# Patient Record
Sex: FEMALE | Race: WHITE
[De-identification: names, ages, dates, MRNs, and addresses within clinical notes are randomized per-mention and may not be internally consistent; named-entity substitution may affect disease eponyms.]

---

## 2018-05-04 ENCOUNTER — HOSPITAL ENCOUNTER (EMERGENCY)
Dept: HOSPITAL 92 - ERS | Age: 83
Discharge: HOME | End: 2018-05-04
Payer: MEDICARE

## 2018-05-04 DIAGNOSIS — M19.90: ICD-10-CM

## 2018-05-04 DIAGNOSIS — I10: ICD-10-CM

## 2018-05-04 DIAGNOSIS — L02.811: Primary | ICD-10-CM

## 2018-05-04 DIAGNOSIS — F03.90: ICD-10-CM

## 2018-05-04 DIAGNOSIS — E11.9: ICD-10-CM

## 2018-05-04 PROCEDURE — 10060 I&D ABSCESS SIMPLE/SINGLE: CPT

## 2018-05-04 PROCEDURE — 87070 CULTURE OTHR SPECIMN AEROBIC: CPT

## 2018-05-04 PROCEDURE — 87186 SC STD MICRODIL/AGAR DIL: CPT

## 2018-05-04 PROCEDURE — 87077 CULTURE AEROBIC IDENTIFY: CPT

## 2018-05-04 PROCEDURE — 87205 SMEAR GRAM STAIN: CPT

## 2018-06-16 ENCOUNTER — HOSPITAL ENCOUNTER (EMERGENCY)
Dept: HOSPITAL 92 - ERS | Age: 83
LOS: 1 days | Discharge: HOME | End: 2018-06-17
Payer: MEDICARE

## 2018-06-16 DIAGNOSIS — F03.90: Primary | ICD-10-CM

## 2018-06-16 DIAGNOSIS — Z79.899: ICD-10-CM

## 2018-06-16 DIAGNOSIS — I10: ICD-10-CM

## 2018-06-16 DIAGNOSIS — E11.9: ICD-10-CM
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
## 2018-06-16 DIAGNOSIS — Z79.4: ICD-10-CM

## 2018-06-16 DIAGNOSIS — M19.90: ICD-10-CM

## 2018-06-16 PROCEDURE — 80053 COMPREHEN METABOLIC PANEL: CPT

## 2018-06-16 PROCEDURE — 51701 INSERT BLADDER CATHETER: CPT

## 2018-06-16 PROCEDURE — A4353 INTERMITTENT URINARY CATH: HCPCS

## 2018-06-16 PROCEDURE — 81015 MICROSCOPIC EXAM OF URINE: CPT

## 2018-06-16 PROCEDURE — 70450 CT HEAD/BRAIN W/O DYE: CPT

## 2018-06-16 PROCEDURE — 84443 ASSAY THYROID STIM HORMONE: CPT

## 2018-06-16 PROCEDURE — 36415 COLL VENOUS BLD VENIPUNCTURE: CPT

## 2018-06-16 PROCEDURE — 82553 CREATINE MB FRACTION: CPT

## 2018-06-16 PROCEDURE — 71045 X-RAY EXAM CHEST 1 VIEW: CPT

## 2018-06-16 PROCEDURE — 72125 CT NECK SPINE W/O DYE: CPT

## 2018-06-16 PROCEDURE — 81003 URINALYSIS AUTO W/O SCOPE: CPT

## 2018-06-16 PROCEDURE — 82550 ASSAY OF CK (CPK): CPT

## 2018-06-16 PROCEDURE — 85025 COMPLETE CBC W/AUTO DIFF WBC: CPT

## 2018-06-16 PROCEDURE — 84484 ASSAY OF TROPONIN QUANT: CPT

## 2018-06-16 PROCEDURE — 93005 ELECTROCARDIOGRAM TRACING: CPT

## 2018-06-17 LAB
ALBUMIN SERPL BCG-MCNC: 4 G/DL (ref 3.4–4.8)
ALP SERPL-CCNC: 71 U/L (ref 40–150)
ALT SERPL W P-5'-P-CCNC: (no result) U/L (ref 8–55)
ANION GAP SERPL CALC-SCNC: 14 MMOL/L (ref 10–20)
AST SERPL-CCNC: 14 U/L (ref 5–34)
BASOPHILS # BLD AUTO: 0 THOU/UL (ref 0–0.2)
BASOPHILS NFR BLD AUTO: 0.5 % (ref 0–1)
BILIRUB SERPL-MCNC: 0.7 MG/DL (ref 0.2–1.2)
BUN SERPL-MCNC: 17 MG/DL (ref 9.8–20.1)
CALCIUM SERPL-MCNC: 9.4 MG/DL (ref 7.8–10.44)
CHLORIDE SERPL-SCNC: 99 MMOL/L (ref 98–107)
CK MB SERPL-MCNC: 0.5 NG/ML (ref 0–6.6)
CK SERPL-CCNC: 47 U/L (ref 29–168)
CO2 SERPL-SCNC: 26 MMOL/L (ref 23–31)
CREAT CL PREDICTED SERPL C-G-VRATE: 0 ML/MIN (ref 70–130)
CRYSTAL-AUWI FLAG: 0.1 (ref 0–15)
EOSINOPHIL # BLD AUTO: 0.1 THOU/UL (ref 0–0.7)
EOSINOPHIL NFR BLD AUTO: 0.6 % (ref 0–10)
GLOBULIN SER CALC-MCNC: 2.6 G/DL (ref 2.4–3.5)
GLUCOSE SERPL-MCNC: 132 MG/DL (ref 83–110)
HEV IGM SER QL: 31.1 (ref 0–7.99)
HGB BLD-MCNC: 11.6 G/DL (ref 12–16)
HYALINE CASTS #/AREA URNS LPF: (no result) LPF
LYMPHOCYTES # BLD: 1.8 THOU/UL (ref 1.2–3.4)
LYMPHOCYTES NFR BLD AUTO: 17.2 % (ref 21–51)
MCH RBC QN AUTO: 30.8 PG (ref 27–31)
MCV RBC AUTO: 88.7 FL (ref 81–99)
MONOCYTES # BLD AUTO: 0.9 THOU/UL (ref 0.11–0.59)
MONOCYTES NFR BLD AUTO: 8.3 % (ref 0–10)
NEUTROPHILS # BLD AUTO: 7.6 THOU/UL (ref 1.4–6.5)
NEUTROPHILS NFR BLD AUTO: 73.4 % (ref 42–75)
PATHC CAST-AUWI FLAG: 1.3 (ref 0–2.49)
PLATELET # BLD AUTO: 223 THOU/UL (ref 130–400)
POTASSIUM SERPL-SCNC: 3.8 MMOL/L (ref 3.5–5.1)
PROT UR STRIP.AUTO-MCNC: 100 MG/DL
RBC # BLD AUTO: 3.76 MILL/UL (ref 4.2–5.4)
RBC UR QL AUTO: (no result) HPF (ref 0–3)
SODIUM SERPL-SCNC: 135 MMOL/L (ref 136–145)
SP GR UR STRIP: 1.02 (ref 1–1.04)
SPERM-AUWI FLAG: 0 (ref 0–9.9)
TROPONIN I SERPL DL<=0.01 NG/ML-MCNC: (no result) NG/ML (ref ?–0.03)
WBC # BLD AUTO: 10.3 THOU/UL (ref 4.8–10.8)
YEAST-AUWI FLAG: 0 (ref 0–25)

## 2018-06-17 NOTE — CT
PRELIMINARY REPORT/[facility]/EMERGENCY AFTER

HOURS PROCEDURE: 

 

EXAM:

CT Head Without Intravenous Contrast

 

EXAM DATE/TIME:

6/17/2018 1:41 AM

 

CLINICAL HISTORY:

88 years old, female; Injury or trauma; Fall; Initial encounter; Abrasion; Not specified; Patient HX:
 Er 9; F88 presents to ed C/O fall. Pt's family reports nh called and said they found her on the floo
r this morning, speech slurred, BP high, and were worried of a stroke. Family reports dementia onset 
x5 years pta. Family reports HX dm.

 

TECHNIQUE:

Axial computed tomography images of the head/brain without intravenous contrast.

 

COMPARISON:

No relevant prior studies available.

 

FINDINGS:

Brain: Volume loss and chronic small vessel ischemic change. Right temporoparietal encephalomalacia/g
liosis. Old lacunar infarction(s).

Ventricles: Normal. No ventriculomegaly.

Bones/joints: Normal. No acute fracture.

Sinuses: Normal as visualized. No acute sinusitis.

Mastoid air cells: Normal as visualized. No mastoid effusion.

Soft tissues: Normal.

 

IMPRESSION:

No intracranial hemorrhage.

 

 

Thank you for allowing us to participate in the care of your patient.

Dictated and Authenticated by: Richy Peters MD

06/17/2018 2:18 AM Central Time ([place] & [place])

 

 

 

FINAL REPORT 

EMERGENCY AFTER HOURS CT BRAIN PERFORMED WITHOUT CONTRAST ENHANCEMENT:

 

Date:  06/17/18 

 

HISTORY:  

Fall with head injury. 

 

FINDINGS:

There is generalized ventricular and sulcal prominence. There is decreased attenuation to the periven
tricular white matter consistent with chronic white matter change. An old area of encephalomalacia ch
ayush in right posterior temporal parietooccipital region is noted. Lacunar infarcts are also seen. No
 hemorrhage or mass effect. Mastoid air cells and visualized sinuses are clear. 

 

IMPRESSION: 

No acute intracranial abnormalities. 

 

This report is in agreement with the preliminary report issued by [facility]. 

 

 

POS: [facility]

## 2018-06-17 NOTE — CT
PRELIMINARY REPORT/[facility]/EMERGENCY AFTER

HOURS PROCEDURE: 

 

EXAM:

CT Cervical Spine Without Intravenous Contrast

 

EXAM DATE/TIME:

6/17/2018 1:40 AM

 

CLINICAL HISTORY:

88 years old, female; Injury or trauma; Fall; Initial encounter; Abrasion; Patient HX: Er 9; F88 pres
ents to ed C/O fall. Pt's family reports nh called and said they found her on the floor this morning,
 speech slurred, BP high, and were worried of a stroke. Family reports dementia onset x5 years pta. F
amily reports HX dm.

 

TECHNIQUE:

Axial computed tomography images of the cervical spine without intravenous contrast. Coronal and sagi
ttal reformatted images were created and reviewed.

 

COMPARISON:

No relevant prior studies available.

 

FINDINGS:

Vertebrae: No fracture.

Discs/Spinal canal/Neural foramina: Degenerative change.

Soft tissues: Unremarkable.

Lung apices: Normal.

 

IMPRESSION:

No fracture.

 

Thank you for allowing us to participate in the care of your patient.

Dictated and Authenticated by: Richy Peters MD

06/17/2018 2:21 AM Central Time ([place] & [place])

 

FINAL REPORT 

EMERGENCY AFTER HOURS CT CERVICAL SPINE PERFORMED WITHOUT CONTRAST ENHANCEMENT:

 

Date:  06/17/18 

 

HISTORY:  

Fall with neck pain. 

 

FINDINGS:

Vertebral bodies are normal in height. Degenerative osteophytic changes are seen along the course of 
the lumbar spine with minimal disc narrowing at C5-6. There are marked degenerative facet changes pre
sent. There is some very mild bilateral foraminal narrowing at C3-4 and left-sided foraminal narrowin
g at C4-5. There is no CT evidence for fracture of the cervical spine. 

 

The lung apices are clear. Carotid bulb calcifications are seen. 

 

IMPRESSION: 

No CT evidence of fracture of the cervical spine. 

 

This report is in agreement with the preliminary report issued by [facility]. 

 

 

POS: [facility]

## 2018-06-17 NOTE — RAD
PORTABLE CHEST:

 

Date:  06/17/18 

 

HISTORY:  

Fall. 

 

FINDINGS:

Heart size within normal limits. There are atherosclerotic changes of aorta. Radiographic changes sug
gest an element of COPD. Bones appear demineralized. No rib fractures are identified. 

 

IMPRESSION: 

Chronic lung change. 

 

 

POS: [facility]

## 2019-01-03 ENCOUNTER — HOSPITAL ENCOUNTER (EMERGENCY)
Dept: HOSPITAL 92 - ERS | Age: 84
LOS: 1 days | Discharge: HOME | End: 2019-01-04
Payer: MEDICARE

## 2019-01-03 DIAGNOSIS — E78.00: ICD-10-CM

## 2019-01-03 DIAGNOSIS — I10: ICD-10-CM

## 2019-01-03 DIAGNOSIS — F03.90: ICD-10-CM

## 2019-01-03 DIAGNOSIS — Z79.84: ICD-10-CM

## 2019-01-03 DIAGNOSIS — N30.91: Primary | ICD-10-CM

## 2019-01-03 DIAGNOSIS — Z79.899: ICD-10-CM

## 2019-01-03 DIAGNOSIS — E11.9: ICD-10-CM

## 2019-01-03 LAB
BACTERIA UR QL AUTO: (no result) HPF
CRYSTAL-AUWI FLAG: 0 (ref 0–15)
GLUCOSE UR STRIP-MCNC: 500 MG/DL
HEV IGM SER QL: 0 (ref 0–7.99)
PATHC CAST-AUWI FLAG: 229.88 (ref 0–2.49)
PROT UR STRIP.AUTO-MCNC: 100 MG/DL
SP GR UR STRIP: 1.02 (ref 1–1.04)
SPERM-AUWI FLAG: 0 (ref 0–9.9)
YEAST-AUWI FLAG: 57.1 (ref 0–25)

## 2019-01-03 PROCEDURE — 87077 CULTURE AEROBIC IDENTIFY: CPT

## 2019-01-03 PROCEDURE — 87086 URINE CULTURE/COLONY COUNT: CPT

## 2019-01-03 PROCEDURE — A4353 INTERMITTENT URINARY CATH: HCPCS

## 2019-01-03 PROCEDURE — 81015 MICROSCOPIC EXAM OF URINE: CPT

## 2019-01-03 PROCEDURE — 51701 INSERT BLADDER CATHETER: CPT

## 2019-01-03 PROCEDURE — 81003 URINALYSIS AUTO W/O SCOPE: CPT

## 2019-01-04 LAB
CASTS #/AREA URNS LPF: (no result) LPF
HYALINE CASTS #/AREA URNS LPF: (no result) LPF
RBC UR QL AUTO: (no result) HPF (ref 0–3)

## 2019-12-26 ENCOUNTER — HOSPITAL ENCOUNTER (EMERGENCY)
Dept: HOSPITAL 92 - ERS | Age: 84
Discharge: HOME | End: 2019-12-26
Payer: MEDICARE
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
## 2019-12-26 DIAGNOSIS — I10: ICD-10-CM

## 2019-12-26 DIAGNOSIS — E11.9: ICD-10-CM

## 2019-12-26 DIAGNOSIS — S32.019A: Primary | ICD-10-CM
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
## 2019-12-26 DIAGNOSIS — E78.00: ICD-10-CM

## 2019-12-26 DIAGNOSIS — F03.90: ICD-10-CM

## 2019-12-26 PROCEDURE — 72100 X-RAY EXAM L-S SPINE 2/3 VWS: CPT

## 2019-12-26 NOTE — RAD
LUMBAR SPINE 3 VIEWS:

 

Date:  12/26/19 

 

HISTORY:  

Back pain. 

 

No comparison. 

 

FINDINGS:

Moderate degenerative changes of lumbar spine. Prominent osteophytes are seen anterolaterally. There 
is mild compression to L1 vertebra. Central and anterior height loss estimated at 20-25%. This is age
-indeterminate and could be subacute given history of recent back pain. Slight buckling of the poster
ior cortex. 

 

The other lumbar vertebra maintain height. Loss of disc space at L4-5 and L5-S1. Prominent facet hype
rtrophy. 

 

IMPRESSION: 

1.  Mild compression of L1 vertebra, age-indeterminate. 

2.  Moderate hypertrophic degenerative changes of the lumbar spine. 

 

 

POS: [facility]

## 2019-12-29 ENCOUNTER — HOSPITAL ENCOUNTER (EMERGENCY)
Dept: HOSPITAL 92 - ERS | Age: 84
Discharge: HOME | End: 2019-12-29
Payer: MEDICARE

## 2019-12-29 DIAGNOSIS — F03.90: ICD-10-CM

## 2019-12-29 DIAGNOSIS — I10: ICD-10-CM

## 2019-12-29 DIAGNOSIS — N39.0: Primary | ICD-10-CM

## 2019-12-29 DIAGNOSIS — Z79.84: ICD-10-CM

## 2019-12-29 DIAGNOSIS — E78.00: ICD-10-CM

## 2019-12-29 DIAGNOSIS — Z79.899: ICD-10-CM

## 2019-12-29 DIAGNOSIS — R53.1: ICD-10-CM

## 2019-12-29 DIAGNOSIS — M19.90: ICD-10-CM

## 2019-12-29 DIAGNOSIS — E11.9: ICD-10-CM

## 2019-12-29 LAB
ALBUMIN SERPL BCG-MCNC: 4.1 G/DL (ref 3.4–4.8)
ALP SERPL-CCNC: 125 U/L (ref 40–110)
ALT SERPL W P-5'-P-CCNC: 7 U/L (ref 8–55)
ANION GAP SERPL CALC-SCNC: 16 MMOL/L (ref 10–20)
AST SERPL-CCNC: 14 U/L (ref 5–34)
BACTERIA UR QL AUTO: (no result) HPF
BASOPHILS # BLD AUTO: 0.1 THOU/UL (ref 0–0.2)
BASOPHILS NFR BLD AUTO: 0.6 % (ref 0–1)
BILIRUB SERPL-MCNC: 0.6 MG/DL (ref 0.2–1.2)
BUN SERPL-MCNC: 34 MG/DL (ref 9.8–20.1)
CALCIUM SERPL-MCNC: 9.3 MG/DL (ref 7.8–10.44)
CHLORIDE SERPL-SCNC: 102 MMOL/L (ref 98–107)
CO2 SERPL-SCNC: 26 MMOL/L (ref 23–31)
CREAT CL PREDICTED SERPL C-G-VRATE: 0 ML/MIN (ref 70–130)
EOSINOPHIL # BLD AUTO: 0.1 THOU/UL (ref 0–0.7)
EOSINOPHIL NFR BLD AUTO: 1.4 % (ref 0–10)
GLOBULIN SER CALC-MCNC: 3.2 G/DL (ref 2.4–3.5)
GLUCOSE SERPL-MCNC: 257 MG/DL (ref 83–110)
GLUCOSE UR STRIP-MCNC: 70 MG/DL
HGB BLD-MCNC: 12.5 G/DL (ref 12–16)
LEUKOCYTE ESTERASE UR QL STRIP.AUTO: 250 LEU/UL
LYMPHOCYTES # BLD: 1.9 THOU/UL (ref 1.2–3.4)
LYMPHOCYTES NFR BLD AUTO: 19.5 % (ref 21–51)
MCH RBC QN AUTO: 30 PG (ref 27–31)
MCV RBC AUTO: 88 FL (ref 78–98)
MONOCYTES # BLD AUTO: 0.7 THOU/UL (ref 0.11–0.59)
MONOCYTES NFR BLD AUTO: 6.6 % (ref 0–10)
NEUTROPHILS # BLD AUTO: 7.2 THOU/UL (ref 1.4–6.5)
NEUTROPHILS NFR BLD AUTO: 71.8 % (ref 42–75)
PLATELET # BLD AUTO: 273 THOU/UL (ref 130–400)
POTASSIUM SERPL-SCNC: 4.6 MMOL/L (ref 3.5–5.1)
PROT UR STRIP.AUTO-MCNC: 50 MG/DL
RBC # BLD AUTO: 4.16 MILL/UL (ref 4.2–5.4)
RBC UR QL AUTO: (no result) HPF (ref 0–3)
SODIUM SERPL-SCNC: 139 MMOL/L (ref 136–145)
WBC # BLD AUTO: 10 THOU/UL (ref 4.8–10.8)
WBC UR QL AUTO: (no result) HPF (ref 0–3)

## 2019-12-29 PROCEDURE — 72125 CT NECK SPINE W/O DYE: CPT

## 2019-12-29 PROCEDURE — 93005 ELECTROCARDIOGRAM TRACING: CPT

## 2019-12-29 PROCEDURE — 80053 COMPREHEN METABOLIC PANEL: CPT

## 2019-12-29 PROCEDURE — 87086 URINE CULTURE/COLONY COUNT: CPT

## 2019-12-29 PROCEDURE — 85025 COMPLETE CBC W/AUTO DIFF WBC: CPT

## 2019-12-29 PROCEDURE — 96361 HYDRATE IV INFUSION ADD-ON: CPT

## 2019-12-29 PROCEDURE — 70450 CT HEAD/BRAIN W/O DYE: CPT

## 2019-12-29 PROCEDURE — A4353 INTERMITTENT URINARY CATH: HCPCS

## 2019-12-29 PROCEDURE — 81015 MICROSCOPIC EXAM OF URINE: CPT

## 2019-12-29 PROCEDURE — 96374 THER/PROPH/DIAG INJ IV PUSH: CPT

## 2019-12-29 PROCEDURE — 36415 COLL VENOUS BLD VENIPUNCTURE: CPT

## 2019-12-29 PROCEDURE — 81003 URINALYSIS AUTO W/O SCOPE: CPT

## 2019-12-29 PROCEDURE — 51701 INSERT BLADDER CATHETER: CPT

## 2019-12-29 NOTE — CT
EXAM: CT of the cervical spine without contrast



HISTORY: Right arm and hand weakness for 2 days. Likely fall



COMPARISON: 6/17/2018



TECHNIQUE: Multiple contiguous axial images were obtained in a CT of the cervical spine without contr
ast. Sagittal and coronal reformats were performed.



FINDINGS: The vertebral bodies and intervertebral discs demonstrate normal height and alignment witho
ut fracture or subluxation. Mild to moderate diffuse degenerative changes are present.  No

prevertebral soft tissue swelling is seen. 



The posterior facets are well aligned. Normal alignment of the skull base with the cervical spine is 
seen.



The lung apices are unremarkable. Calcifications are seen in the carotid arteries.



IMPRESSION: No evidence of acute osseous abnormality of the cervical spine.



Reported By: José Miguel Beltran 

Electronically Signed:  12/29/2019 2:13 PM

## 2020-02-11 ENCOUNTER — HOSPITAL ENCOUNTER (INPATIENT)
Dept: HOSPITAL 92 - ERS | Age: 85
LOS: 6 days | Discharge: NURSING, REHAB OR LONG-TERM CARE | DRG: 871 | End: 2020-02-17
Attending: INTERNAL MEDICINE | Admitting: INTERNAL MEDICINE
Payer: MEDICARE

## 2020-02-11 VITALS — BODY MASS INDEX: 19.3 KG/M2

## 2020-02-11 DIAGNOSIS — E11.65: ICD-10-CM

## 2020-02-11 DIAGNOSIS — Z90.49: ICD-10-CM

## 2020-02-11 DIAGNOSIS — N17.9: ICD-10-CM

## 2020-02-11 DIAGNOSIS — F02.80: ICD-10-CM

## 2020-02-11 DIAGNOSIS — A41.9: Primary | ICD-10-CM

## 2020-02-11 DIAGNOSIS — J18.9: ICD-10-CM

## 2020-02-11 DIAGNOSIS — E44.0: ICD-10-CM

## 2020-02-11 DIAGNOSIS — R65.20: ICD-10-CM

## 2020-02-11 DIAGNOSIS — E87.2: ICD-10-CM

## 2020-02-11 DIAGNOSIS — I10: ICD-10-CM

## 2020-02-11 DIAGNOSIS — G30.9: ICD-10-CM

## 2020-02-11 LAB
ALBUMIN SERPL BCG-MCNC: 3.6 G/DL (ref 3.4–4.8)
ALP SERPL-CCNC: 138 U/L (ref 40–110)
ALT SERPL W P-5'-P-CCNC: (no result) U/L (ref 8–55)
ANION GAP SERPL CALC-SCNC: 17 MMOL/L (ref 10–20)
AST SERPL-CCNC: 10 U/L (ref 5–34)
BILIRUB SERPL-MCNC: 0.9 MG/DL (ref 0.2–1.2)
BUN SERPL-MCNC: 31 MG/DL (ref 9.8–20.1)
CALCIUM SERPL-MCNC: 9.4 MG/DL (ref 7.8–10.44)
CHLORIDE SERPL-SCNC: 94 MMOL/L (ref 98–107)
CK SERPL-CCNC: 27 U/L (ref 29–168)
CO2 SERPL-SCNC: 23 MMOL/L (ref 23–31)
CREAT CL PREDICTED SERPL C-G-VRATE: 0 ML/MIN (ref 70–130)
GLOBULIN SER CALC-MCNC: 4.1 G/DL (ref 2.4–3.5)
GLUCOSE SERPL-MCNC: 380 MG/DL (ref 83–110)
GLUCOSE UR STRIP-MCNC: >=1000 MG/DL
HGB BLD-MCNC: 11.7 G/DL (ref 12–16)
LIPASE SERPL-CCNC: 13 U/L (ref 8–78)
MAGNESIUM SERPL-MCNC: 2 MG/DL (ref 1.6–2.6)
MCH RBC QN AUTO: 29.3 PG (ref 27–31)
MCV RBC AUTO: 88.9 FL (ref 78–98)
MDIFF COMPLETE?: YES
PLATELET # BLD AUTO: 374 THOU/UL (ref 130–400)
POTASSIUM SERPL-SCNC: 3.6 MMOL/L (ref 3.5–5.1)
PROT UR STRIP.AUTO-MCNC: 200 MG/DL
RBC # BLD AUTO: 4.01 MILL/UL (ref 4.2–5.4)
RBC UR QL AUTO: (no result) HPF (ref 0–3)
SODIUM SERPL-SCNC: 130 MMOL/L (ref 136–145)
WBC # BLD AUTO: 22.5 THOU/UL (ref 4.8–10.8)
WBC UR QL AUTO: (no result) HPF (ref 0–3)

## 2020-02-11 PROCEDURE — 71046 X-RAY EXAM CHEST 2 VIEWS: CPT

## 2020-02-11 PROCEDURE — 96365 THER/PROPH/DIAG IV INF INIT: CPT

## 2020-02-11 PROCEDURE — 84484 ASSAY OF TROPONIN QUANT: CPT

## 2020-02-11 PROCEDURE — 36415 COLL VENOUS BLD VENIPUNCTURE: CPT

## 2020-02-11 PROCEDURE — 81015 MICROSCOPIC EXAM OF URINE: CPT

## 2020-02-11 PROCEDURE — 81003 URINALYSIS AUTO W/O SCOPE: CPT

## 2020-02-11 PROCEDURE — 87804 INFLUENZA ASSAY W/OPTIC: CPT

## 2020-02-11 PROCEDURE — 82550 ASSAY OF CK (CPK): CPT

## 2020-02-11 PROCEDURE — 51701 INSERT BLADDER CATHETER: CPT

## 2020-02-11 PROCEDURE — 36416 COLLJ CAPILLARY BLOOD SPEC: CPT

## 2020-02-11 PROCEDURE — 85025 COMPLETE CBC W/AUTO DIFF WBC: CPT

## 2020-02-11 PROCEDURE — 83880 ASSAY OF NATRIURETIC PEPTIDE: CPT

## 2020-02-11 PROCEDURE — 71045 X-RAY EXAM CHEST 1 VIEW: CPT

## 2020-02-11 PROCEDURE — 83735 ASSAY OF MAGNESIUM: CPT

## 2020-02-11 PROCEDURE — 93005 ELECTROCARDIOGRAM TRACING: CPT

## 2020-02-11 PROCEDURE — 96368 THER/DIAG CONCURRENT INF: CPT

## 2020-02-11 PROCEDURE — 87086 URINE CULTURE/COLONY COUNT: CPT

## 2020-02-11 PROCEDURE — 80048 BASIC METABOLIC PNL TOTAL CA: CPT

## 2020-02-11 PROCEDURE — A4353 INTERMITTENT URINARY CATH: HCPCS

## 2020-02-11 PROCEDURE — 96361 HYDRATE IV INFUSION ADD-ON: CPT

## 2020-02-11 PROCEDURE — 87040 BLOOD CULTURE FOR BACTERIA: CPT

## 2020-02-11 PROCEDURE — 83690 ASSAY OF LIPASE: CPT

## 2020-02-11 PROCEDURE — 83605 ASSAY OF LACTIC ACID: CPT

## 2020-02-11 PROCEDURE — 80053 COMPREHEN METABOLIC PANEL: CPT

## 2020-02-11 RX ADMIN — CEFTRIAXONE SCH MLS: 1 INJECTION, POWDER, FOR SOLUTION INTRAMUSCULAR; INTRAVENOUS at 20:55

## 2020-02-11 RX ADMIN — AZITHROMYCIN SCH MLS: 500 INJECTION, POWDER, LYOPHILIZED, FOR SOLUTION INTRAVENOUS at 21:11

## 2020-02-11 NOTE — PDOC.HHP
Hospitalist HPI





- History of Present Illness


Cough


History of Present Illness: 


Ms. Walls is a an 89-year-old lady with past medical history of dementia, type 

2 diabetes, hypertension, who presents from the nursing home with cough.  

Reportedly she has had decreased intake and has been coughing extensively in 

the nursing home.  Majority of the history was obtained from the daughter.  She 

has been living in the nursing home for the past several years.  Her dementia 

has progressed to the point that she is unable to recognize her surroundings.  

She does still carry on conversations with the daughter and remember some long-

term aspects of her life.  Reportedly in the ED, her WBC count was 22,000, 

there were checks x-ray which demonstrated a right basilar pneumonia, she had 

increased her from her creatinine to 1.3-1.9.  Daughter states that she does 

spend majority of her time in bed.  States that she was admitted for pneumonia 

many years ago but has not been hospitalized for some time now.





Hospitalist ROS





- Review of Systems


ROS unobtainable: due to mental status





- Medication


Medications: 


Active Medications











Generic Name Dose Route Start Last Admin





  Trade Name Freq  PRN Reason Stop Dose Admin


 


Sodium Chloride  1,000 mls @ 100 mls/hr  02/11/20 17:29  02/11/20 17:44





  Normal Saline 0.9%  IV  02/12/20 03:25  1,000 mls





  .Q10H RITA   Administration





     





     





     





     








 











 Medication  Instructions  Recorded  Confirmed  Type


 


Acetaminophen With Codeine 1 tablet PO Q6HR PRN 02/11/20 02/11/20 History





[Tylenol with Codeine #3]    


 


Acetaminophen [Tylenol] 650 mg PO Q4HR PRN 02/11/20 02/11/20 History


 


Cyanocobalamin (Vitamin B-12) 1,000 mcg PO DAILY 02/11/20 02/11/20 History





[B-12]    


 


Cyclobenzaprine [Flexeril] 10 mg PO TID PRN 02/11/20 02/11/20 History


 


Dextromethorphan Polistirex 5 ml PO Q6HR PRN 02/11/20 02/11/20 History





[12-Hour Cough Relief]    


 


Dextromethorphan Polistirex [Cough 5 ml PO Q12HR PRN 02/11/20 02/11/20 History





DM ER]    


 


Losartan/Hydrochlorothiazide 50 mg PO DAILY 02/11/20 02/11/20 History





[Losartan-Hctz 50-12.5 mg Tab]    


 


Rosuvastatin Calcium 10 mg PO DAILY 02/11/20 02/11/20 History


 


metFORMIN HCl [Metformin HCl] 500 mg PO DAILY 02/11/20 02/11/20 History














Hospitalist History





- Past Medical History


Cardiac: reports: HTN


Pulmonary: reports: no pertinent history


CNS: reports: Dementia


Gastrointestinal: reports: no pertinent history


Heme/Onc: reports: no pertinent history


Hepatobiliary: reports: no pertinent history


Psych: reports: no pertinent history


Musculoskeletal: reports: no pertinent history


Rheumatologic: reports: no pertinent history


Infectious Disease: reports: no pertinent history


ENT: reports: no pertinent history


Renal/: reports: no pertinent history


Endocrine: reports: Diabetes





- Past Surgical History


Past Surgical History: reports: Appendectomy





- Family History


Family History: reports: cardiac disorder





- Social History


Alcohol: reports: None


Drugs: reports: none


Living Situation: Nursing Home





- Exam


General Appearance: NAD, ill appearing


General - other findings: temporal wasting


Eye: PERRL, anicteric sclera


ENT: normocephalic atraumatic


Neck: supple, no JVD


Heart: RRR, no murmur, no gallops


Respiratory: CTAB, no wheezes, normal chest expansion, rales


Gastrointestinal: soft, non-tender, non-distended, normal bowel sounds


Extremities: no cyanosis, no clubbing, no edema


Skin: normal turgor, no lesions, no rashes


Neurological: cranial nerve grossly intact, normal sensation to touch, no focal 

deficits


Musculoskeletal: normal strength, no muscle wasting, generalized weakness, 

diffuse muscle atrophy


Psychiatric: normal affect, normal behavior





Hospitalist Results





- Labs


Result Diagrams: 


 02/11/20 14:07





 02/11/20 14:07


Lab results: 


 











WBC  22.5 thou/uL (4.8-10.8)  H  02/11/20  14:07    


 


Hgb  11.7 g/dL (12.0-16.0)  L  02/11/20  14:07    


 


Hct  35.6 % (36.0-47.0)  L  02/11/20  14:07    


 


MCV  88.9 fL (78.0-98.0)   02/11/20  14:07    


 


Plt Count  374 thou/uL (130-400)   02/11/20  14:07    


 


Band Neuts % (Manual)  4 % (5-11)  L  02/11/20  14:07    


 


Sodium  130 mmol/L (136-145)  L  02/11/20  14:07    


 


Potassium  3.6 mmol/L (3.5-5.1)   02/11/20  14:07    


 


Chloride  94 mmol/L ()  L  02/11/20  14:07    


 


Carbon Dioxide  23 mmol/L (23-31)   02/11/20  14:07    


 


BUN  31 mg/dL (9.8-20.1)  H  02/11/20  14:07    


 


Creatinine  1.97 mg/dL (0.6-1.1)  H  02/11/20  14:07    


 


Glucose  380 mg/dL ()  H  02/11/20  14:07    


 


Lactic Acid  3.6 mmol/L (0.5-2.2)  H  02/11/20  17:59    


 


Calcium  9.4 mg/dL (7.8-10.44)   02/11/20  14:07    


 


Total Bilirubin  0.9 mg/dL (0.2-1.2)   02/11/20  14:07    


 


AST  10 U/L (5-34)   02/11/20  14:07    


 


ALT  Less than  7 U/L (8-55)  L  02/11/20  14:07    


 


Alkaline Phosphatase  138 U/L ()  H  02/11/20  14:07    


 


Creatine Kinase  27 U/L ()  L  02/11/20  14:07    


 


Troponin I  0.016 ng/mL (< 0.028)   02/11/20  14:07    


 


B-Natriuretic Peptide  135.4 pg/mL (0-100)  H  02/11/20  14:07    


 


Serum Total Protein  7.7 g/dL (6.0-8.3)   02/11/20  14:07    


 


Albumin  3.6 g/dL (3.4-4.8)   02/11/20  14:07    


 


Lipase  13 U/L (8-78)   02/11/20  14:07    


 


Urine Ketones  Negative mg/dL (Negative)   02/11/20  14:37    


 


Urine Blood  1+  (Negative)  A  02/11/20  14:37    


 


Urine Nitrite  Negative  (Negative)   02/11/20  14:37    


 


Ur Leukocyte Esterase  Negative Elva/uL (Negative)   02/11/20  14:37    


 


Urine RBC  0-3 HPF (0-3)   02/11/20  14:37    


 


Urine WBC  0-3 HPF (0-3)   02/11/20  14:37    


 


Ur Squamous Epith Cells  0-3 HPF (0-3)   02/11/20  14:37    


 


Urine Bacteria  None Seen HPF (None Seen)   02/11/20  14:37    











Additional comment: 





 Vital Signs - 24 hr











  02/11/20 02/11/20





  17:26 18:00


 


Temperature 98.4 F 


 


Pulse Rate 89 


 


Respiratory 18 





Rate  


 


Blood Pressure 163/72 H 





[Supine]  


 


O2 Sat by Pulse 96 96





Oximetry  














- EKG Interpretation


EKG: 





reviewed by me in the chart





- Radiology Interpretation


  ** Chest x-ray


Status: report reviewed by me





Hospitalist H&P A/P





- Problem


(1) Community acquired pneumonia


Code(s): J18.9 - PNEUMONIA, UNSPECIFIED ORGANISM   Status: Acute   





(2) Severe sepsis


Code(s): A41.9 - SEPSIS, UNSPECIFIED ORGANISM; R65.20 - SEVERE SEPSIS WITHOUT 

SEPTIC SHOCK   Status: Acute   





(3) Acute kidney injury


Code(s): N17.9 - ACUTE KIDNEY FAILURE, UNSPECIFIED   Status: Acute   





(4) Dementia


Code(s): F03.90 - UNSPECIFIED DEMENTIA WITHOUT BEHAVIORAL DISTURBANCE   Status: 

Acute   





(5) HTN (hypertension)


Code(s): I10 - ESSENTIAL (PRIMARY) HYPERTENSION   Status: Acute   





(6) T2DM (type 2 diabetes mellitus)


Status: Acute   





(7) Debility


Code(s): R53.81 - OTHER MALAISE   Status: Acute   





- Plan


Plan: 


Treatment for pneumonia/severe sepsis.  Likely greater than 2 midnights 

required in the evaluation and treatment of this illness


Start ceftriaxone and azithromycin, follow sputum and blood cultures


Consult for PT/OT/speech evaluation


She received early goal-directed therapy of 2 L in the ED, continue with NS at 

100, continue to monitor lactate


We will resume home medications for chronic conditions once they have been 

reconciled


MARIUM related to the sepsis, will fluid challenge the kidney





DVT prophylaxis: Lovenox, renally dosed


CODE STATUS: Full code


ACP: Daughter is the surrogate decision maker


Disposition: Continue to treat for pneumonia.  Evaluation by speech.

## 2020-02-11 NOTE — RAD
XR Chest 1 View Portable



HISTORY: Cough



COMPARISON: 6/20/2018



FINDINGS: The heart size is normal. The lungs are well expanded without pneumothorax or pleural effus
ions. There is patchy infiltrate in the right medial lung base



IMPRESSION: Findings are suspicious for right basilar pneumonia.



Reported By: Eduin Yates 

Electronically Signed:  2/11/2020 2:36 PM

## 2020-02-12 LAB
ALBUMIN SERPL BCG-MCNC: 3 G/DL (ref 3.4–4.8)
ALP SERPL-CCNC: 113 U/L (ref 40–110)
ALT SERPL W P-5'-P-CCNC: (no result) U/L (ref 8–55)
ANION GAP SERPL CALC-SCNC: 15 MMOL/L (ref 10–20)
AST SERPL-CCNC: 9 U/L (ref 5–34)
BASOPHILS # BLD AUTO: 0 THOU/UL (ref 0–0.2)
BASOPHILS NFR BLD AUTO: 0.1 % (ref 0–1)
BILIRUB SERPL-MCNC: 0.6 MG/DL (ref 0.2–1.2)
BUN SERPL-MCNC: 25 MG/DL (ref 9.8–20.1)
CALCIUM SERPL-MCNC: 8.6 MG/DL (ref 7.8–10.44)
CHLORIDE SERPL-SCNC: 103 MMOL/L (ref 98–107)
CO2 SERPL-SCNC: 21 MMOL/L (ref 23–31)
CREAT CL PREDICTED SERPL C-G-VRATE: 22 ML/MIN (ref 70–130)
EOSINOPHIL # BLD AUTO: 0.1 THOU/UL (ref 0–0.7)
EOSINOPHIL NFR BLD AUTO: 0.3 % (ref 0–10)
GLOBULIN SER CALC-MCNC: 3.3 G/DL (ref 2.4–3.5)
GLUCOSE SERPL-MCNC: 137 MG/DL (ref 83–110)
HGB BLD-MCNC: 10.4 G/DL (ref 12–16)
LYMPHOCYTES # BLD: 1.7 THOU/UL (ref 1.2–3.4)
LYMPHOCYTES NFR BLD AUTO: 7.4 % (ref 21–51)
MCH RBC QN AUTO: 30.4 PG (ref 27–31)
MCV RBC AUTO: 88.3 FL (ref 78–98)
MONOCYTES # BLD AUTO: 1.8 THOU/UL (ref 0.11–0.59)
MONOCYTES NFR BLD AUTO: 8.1 % (ref 0–10)
NEUTROPHILS # BLD AUTO: 19 THOU/UL (ref 1.4–6.5)
NEUTROPHILS NFR BLD AUTO: 84.2 % (ref 42–75)
PLATELET # BLD AUTO: 327 THOU/UL (ref 130–400)
POTASSIUM SERPL-SCNC: 3 MMOL/L (ref 3.5–5.1)
RBC # BLD AUTO: 3.41 MILL/UL (ref 4.2–5.4)
SODIUM SERPL-SCNC: 136 MMOL/L (ref 136–145)
WBC # BLD AUTO: 22.6 THOU/UL (ref 4.8–10.8)

## 2020-02-12 RX ADMIN — INSULIN LISPRO PRN UNIT: 100 INJECTION, SOLUTION INTRAVENOUS; SUBCUTANEOUS at 00:22

## 2020-02-12 RX ADMIN — AZITHROMYCIN SCH MLS: 500 INJECTION, POWDER, LYOPHILIZED, FOR SOLUTION INTRAVENOUS at 21:07

## 2020-02-12 RX ADMIN — CEFTRIAXONE SCH MLS: 1 INJECTION, POWDER, FOR SOLUTION INTRAMUSCULAR; INTRAVENOUS at 21:06

## 2020-02-12 RX ADMIN — INSULIN LISPRO PRN UNIT: 100 INJECTION, SOLUTION INTRAVENOUS; SUBCUTANEOUS at 12:15

## 2020-02-12 RX ADMIN — INSULIN LISPRO PRN UNIT: 100 INJECTION, SOLUTION INTRAVENOUS; SUBCUTANEOUS at 21:12

## 2020-02-12 NOTE — RAD
EXAM:

Chest 2 views:



HISTORY:

Pneumonia



COMPARISON:

7/2/2009



FINDINGS:

There is a normal-sized cardiomediastinal silhouette. There is no evidence of consolidation, mass, or
 pleural effusion.   The bones are unremarkable. 





IMPRESSION:

No evidence of acute cardiopulmonary disease



Reported By: José Miguel Beltran 

Electronically Signed:  2/12/2020 8:43 AM

## 2020-02-12 NOTE — PDOC.HOSPP
- Subjective


Encounter Date: 02/12/20


Encounter Time: 10:30


Subjective: 





Patient seen and examined. No new complaints. No overnight events





- Objective


Vital Signs & Weight: 


 Vital Signs (12 hours)











  Temp Pulse Pulse Resp BP BP Pulse Ox


 


 02/12/20 10:49    90   112/72  


 


 02/12/20 08:10  98.8 F  106 H   18   168/66 H  93 L


 


 02/12/20 08:00        93 L


 


 02/12/20 05:00  98.5 F  72   19   148/79 H  96


 


 02/12/20 00:24  98.5 F  70   18   131/72  94 L














  Pulse Ox


 


 02/12/20 10:49  93 L


 


 02/12/20 08:10 


 


 02/12/20 08:00 


 


 02/12/20 05:00 


 


 02/12/20 00:24 








 Weight











Weight                         119 lb 14.4 oz














I&O: 


 











 02/11/20 02/12/20 02/13/20





 06:59 06:59 06:59


 


Intake Total  1500 240


 


Balance  1500 240











Result Diagrams: 


 02/12/20 05:16





 02/12/20 05:16


Additional Labs: 


 Accuchecks











  02/12/20 02/12/20 02/11/20





  05:54 00:24 13:44


 


POC Glucose  148 H  284 H  370 H














Hospitalist ROS





- Review of Systems


ROS unobtainable: due to mental status





- Medication


Medications: 


Active Medications











Generic Name Dose Route Start Last Admin





  Trade Name Freq  PRN Reason Stop Dose Admin


 


Acetaminophen  650 mg  02/11/20 20:33  02/11/20 21:14





  Tylenol  PO   650 mg





  Q4H PRN   Administration





  Headache/Fever/Mild Pain (1-3)   





     





     





     


 


Enoxaparin Sodium  30 mg  02/12/20 09:00  02/12/20 08:02





  Lovenox  SC   30 mg





  0900 RITA   Administration





     





     





     





     


 


Guaifenesin  400 mg  02/11/20 20:46  02/11/20 21:17





  Organ-I Nr  PO   400 mg





  Q4H PRN   Administration





  Cough   





     





     





     


 


Azithromycin 500 mg/ Sodium  250 mls @ 250 mls/hr  02/11/20 22:00  02/11/20 21:

11





  Chloride  IVPB   250 mls





  Q24HR RITA   Administration





     





     





     





     


 


Ceftriaxone Sodium 1 gm/  100 mls @ 200 mls/hr  02/11/20 21:00  02/11/20 20:55





  Sodium Chloride  IVPB   100 mls





  Q24HR RITA   Administration





     





     





     





     


 


Sodium Chloride  1,000 mls @ 75 mls/hr  02/12/20 04:15  02/12/20 08:06





  Normal Saline 0.9%  IV   1,000 mls





  .[id number] RITA   Administration





     





     





     





     


 


Insulin Human Lispro  0 units  02/11/20 23:53  02/12/20 00:22





  Humalog  SC   3 unit





  .BEDTIME SLIDING SC PRN   Administration





  Bedtime Correctional Scale   





     





     





     














- Exam


General Appearance: NAD, awake alert


Eye: PERRL, anicteric sclera


ENT: normocephalic atraumatic, no oropharyngeal lesions


Neck: supple, symmetric, no JVD


Heart: RRR, no murmur, no gallops


Respiratory: no wheezes, no tachypnea, rales


Gastrointestinal: soft, non-tender, non-distended


Extremities: no cyanosis, no clubbing


Skin: normal turgor, no lesions


Neurological: no focal deficits


Musculoskeletal: normal tone, normal strength


Psychiatric: normal affect





Hosp A/P


(1) Severe sepsis


Code(s): A41.9 - SEPSIS, UNSPECIFIED ORGANISM; R65.20 - SEVERE SEPSIS WITHOUT 

SEPTIC SHOCK   Status: Acute   





(2) Acute kidney injury


Code(s): N17.9 - ACUTE KIDNEY FAILURE, UNSPECIFIED   Status: Acute   





(3) Community acquired pneumonia


Code(s): J18.9 - PNEUMONIA, UNSPECIFIED ORGANISM   Status: Acute   


Qualifiers: 


   Laterality: right   Lung location: lower lobe of lung   Qualified Code(s): 

J18.9 - Pneumonia, unspecified organism   





(4) Debility


Code(s): R53.81 - OTHER MALAISE   Status: Chronic   





(5) Dementia


Code(s): F03.90 - UNSPECIFIED DEMENTIA WITHOUT BEHAVIORAL DISTURBANCE   Status: 

Chronic   


Qualifiers: 


   Dementia type: Alzheimer's disease 





(6) HTN (hypertension)


Code(s): I10 - ESSENTIAL (PRIMARY) HYPERTENSION   Status: Chronic   





(7) T2DM (type 2 diabetes mellitus)


Status: Chronic   





(8) Lactic acidosis


Code(s): E87.2 - ACIDOSIS   Status: Resolved   





(9) Protein-calorie malnutrition, moderate


Code(s): E44.0 - MODERATE PROTEIN-CALORIE MALNUTRITION   Status: Chronic   





- Plan


old records reviewed/req, plan discussed w/ family, continue antibiotics, 

respiratory therapy





2/12/20


pt has clinical improvement


continue current iv antibiotic


replace potassium


medication reviewed and continue supportive care and symptomatic treatment

## 2020-02-13 LAB
ALBUMIN SERPL BCG-MCNC: 2.6 G/DL (ref 3.4–4.8)
ALP SERPL-CCNC: 107 U/L (ref 40–110)
ALT SERPL W P-5'-P-CCNC: (no result) U/L (ref 8–55)
ANION GAP SERPL CALC-SCNC: 13 MMOL/L (ref 10–20)
AST SERPL-CCNC: 11 U/L (ref 5–34)
BASOPHILS # BLD AUTO: 0 THOU/UL (ref 0–0.2)
BASOPHILS NFR BLD AUTO: 0 % (ref 0–1)
BILIRUB SERPL-MCNC: 0.4 MG/DL (ref 0.2–1.2)
BUN SERPL-MCNC: 18 MG/DL (ref 9.8–20.1)
CALCIUM SERPL-MCNC: 8.1 MG/DL (ref 7.8–10.44)
CHLORIDE SERPL-SCNC: 106 MMOL/L (ref 98–107)
CO2 SERPL-SCNC: 21 MMOL/L (ref 23–31)
CREAT CL PREDICTED SERPL C-G-VRATE: 23 ML/MIN (ref 70–130)
EOSINOPHIL # BLD AUTO: 0.1 THOU/UL (ref 0–0.7)
EOSINOPHIL NFR BLD AUTO: 0.4 % (ref 0–10)
GLOBULIN SER CALC-MCNC: 3.2 G/DL (ref 2.4–3.5)
GLUCOSE SERPL-MCNC: 193 MG/DL (ref 83–110)
HGB BLD-MCNC: 10 G/DL (ref 12–16)
LYMPHOCYTES # BLD: 1.7 THOU/UL (ref 1.2–3.4)
LYMPHOCYTES NFR BLD AUTO: 8.9 % (ref 21–51)
MCH RBC QN AUTO: 30.3 PG (ref 27–31)
MCV RBC AUTO: 86.8 FL (ref 78–98)
MONOCYTES # BLD AUTO: 1.6 THOU/UL (ref 0.11–0.59)
MONOCYTES NFR BLD AUTO: 8.2 % (ref 0–10)
NEUTROPHILS # BLD AUTO: 15.6 THOU/UL (ref 1.4–6.5)
NEUTROPHILS NFR BLD AUTO: 82.4 % (ref 42–75)
PLATELET # BLD AUTO: 262 THOU/UL (ref 130–400)
POTASSIUM SERPL-SCNC: 3.2 MMOL/L (ref 3.5–5.1)
RBC # BLD AUTO: 3.3 MILL/UL (ref 4.2–5.4)
SODIUM SERPL-SCNC: 137 MMOL/L (ref 136–145)
WBC # BLD AUTO: 18.9 THOU/UL (ref 4.8–10.8)

## 2020-02-13 RX ADMIN — CEFTRIAXONE SCH MLS: 1 INJECTION, POWDER, FOR SOLUTION INTRAMUSCULAR; INTRAVENOUS at 21:07

## 2020-02-13 RX ADMIN — AZITHROMYCIN SCH MLS: 500 INJECTION, POWDER, LYOPHILIZED, FOR SOLUTION INTRAVENOUS at 21:08

## 2020-02-13 RX ADMIN — INSULIN LISPRO PRN UNIT: 100 INJECTION, SOLUTION INTRAVENOUS; SUBCUTANEOUS at 15:38

## 2020-02-13 RX ADMIN — CYANOCOBALAMIN TAB 1000 MCG SCH MCG: 1000 TAB at 08:37

## 2020-02-13 RX ADMIN — INSULIN LISPRO PRN UNIT: 100 INJECTION, SOLUTION INTRAVENOUS; SUBCUTANEOUS at 05:18

## 2020-02-13 RX ADMIN — INSULIN LISPRO PRN UNIT: 100 INJECTION, SOLUTION INTRAVENOUS; SUBCUTANEOUS at 11:41

## 2020-02-13 NOTE — PDOC.HOSPP
- Subjective


Encounter Date: 02/13/20


Encounter Time: 10:15


Subjective: 





Patient seen and examined.   No overnight events





- Objective


Vital Signs & Weight: 


 Vital Signs (12 hours)











  Temp Pulse Resp BP Pulse Ox


 


 02/13/20 08:00  98.3 F  99  18  186/83 H  93 L








 Weight











Admit Weight                   119 lb 14.4 oz


 


Weight                         119 lb 14.4 oz














I&O: 


 











 02/12/20 02/13/20 02/14/20





 06:59 06:59 06:59


 


Intake Total 1500 1850 240


 


Balance 1500 1850 240











Result Diagrams: 


 02/13/20 05:11





 02/13/20 05:11


Additional Labs: 


 Accuchecks











  02/13/20 02/13/20 02/12/20





  11:36 05:14 20:06


 


POC Glucose  302 H  197 H  293 H














  02/12/20





  17:17


 


POC Glucose  227 H














Hospitalist ROS





- Review of Systems


ROS unobtainable: due to mental status





- Medication


Medications: 


Active Medications











Generic Name Dose Route Start Last Admin





  Trade Name Freq  PRN Reason Stop Dose Admin


 


Acetaminophen  650 mg  02/11/20 20:33  02/12/20 21:25





  Tylenol  PO   650 mg





  Q4H PRN   Administration





  Headache/Fever/Mild Pain (1-3)   





     





     





     


 


Cyanocobalamin  1,000 mcg  02/13/20 09:00  02/13/20 08:37





  Vitamin B-12  PO   1,000 mcg





  DAILY RITA   Administration





     





     





     





     


 


Enoxaparin Sodium  30 mg  02/12/20 09:00  02/13/20 08:17





  Lovenox  SC   30 mg





  0900 RITA   Administration





     





     





     





     


 


Guaifenesin  400 mg  02/11/20 20:46  02/12/20 21:25





  Organ-I Nr  PO   400 mg





  Q4H PRN   Administration





  Cough   





     





     





     


 


HCTZ/Losartan Potassium  1 tab  02/13/20 09:00  02/13/20 11:28





  Hyzaar 50/12.5  PO   1 tab





  DAILY RITA   Administration





     





     





     





     


 


Azithromycin 500 mg/ Sodium  250 mls @ 250 mls/hr  02/11/20 22:00  02/12/20 21:

07





  Chloride  IVPB   250 mls





  Q24HR RITA   Administration





     





     





     





     


 


Ceftriaxone Sodium 1 gm/  100 mls @ 200 mls/hr  02/11/20 21:00  02/12/20 21:06





  Sodium Chloride  IVPB   100 mls





  Q24HR RITA   Administration





     





     





     





     


 


Insulin Human Lispro  0 units  02/11/20 23:53  02/13/20 11:41





  Humalog  SC   5 unit





  .MILD SLIDING SCALE PRN   Administration





  Mild Correctional Scale   





     





     





     


 


Insulin Human Lispro  0 units  02/11/20 23:53  02/12/20 21:12





  Humalog  SC   3 unit





  .BEDTIME SLIDING SC PRN   Administration





  Bedtime Correctional Scale   





     





     





     


 


Metformin HCl  500 mg  02/13/20 09:00  02/13/20 08:37





  Glucophage  PO   500 mg





  DAILY RITA   Administration





     





     





     





     


 


Rosuvastatin Calcium  10 mg  02/13/20 09:00  02/13/20 08:37





  Crestor  PO   10 mg





  DAILY RITA   Administration





     





     





     





     














- Exam


General Appearance: NAD, ill appearing


Eye: PERRL, anicteric sclera


ENT: normocephalic atraumatic, no oropharyngeal lesions


Neck: supple, symmetric, no JVD


Heart: RRR, no murmur, no gallops, no rubs


Respiratory: CTAB, no wheezes, no rales, no ronchi


Gastrointestinal: soft, non-tender, non-distended, normal bowel sounds


Extremities: no cyanosis, no clubbing, no edema


Skin: normal turgor, no lesions


Neurological: no focal deficits


Musculoskeletal: generalized weakness, diffuse muscle atrophy


Psychiatric: normal affect





Hosp A/P


(1) Severe sepsis


Code(s): A41.9 - SEPSIS, UNSPECIFIED ORGANISM; R65.20 - SEVERE SEPSIS WITHOUT 

SEPTIC SHOCK   Status: Acute   





(2) Acute kidney injury


Code(s): N17.9 - ACUTE KIDNEY FAILURE, UNSPECIFIED   Status: Acute   





(3) Community acquired pneumonia


Code(s): J18.9 - PNEUMONIA, UNSPECIFIED ORGANISM   Status: Acute   


Qualifiers: 


   Laterality: right   Lung location: lower lobe of lung   Qualified Code(s): 

J18.9 - Pneumonia, unspecified organism   





(4) Debility


Code(s): R53.81 - OTHER MALAISE   Status: Chronic   





(5) Dementia


Code(s): F03.90 - UNSPECIFIED DEMENTIA WITHOUT BEHAVIORAL DISTURBANCE   Status: 

Chronic   


Qualifiers: 


   Dementia type: Alzheimer's disease 





(6) HTN (hypertension)


Code(s): I10 - ESSENTIAL (PRIMARY) HYPERTENSION   Status: Chronic   





(7) T2DM (type 2 diabetes mellitus)


Status: Chronic   





(8) Lactic acidosis


Code(s): E87.2 - ACIDOSIS   Status: Resolved   





(9) Protein-calorie malnutrition, moderate


Code(s): E44.0 - MODERATE PROTEIN-CALORIE MALNUTRITION   Status: Chronic   





- Plan


old records reviewed/req, plan discussed w/ family, 





2/12/20


pt has clinical improvement


continue current iv antibiotic


replace potassium


medication reviewed and continue supportive care and symptomatic treatment





2/13/20


continue LR


continue antibiotic


will need snu placement


diet as per speech


discussed with family

## 2020-02-14 LAB
ANION GAP SERPL CALC-SCNC: 12 MMOL/L (ref 10–20)
BASOPHILS # BLD AUTO: 0.1 THOU/UL (ref 0–0.2)
BASOPHILS NFR BLD AUTO: 0.3 % (ref 0–1)
BUN SERPL-MCNC: 13 MG/DL (ref 9.8–20.1)
CALCIUM SERPL-MCNC: 8.2 MG/DL (ref 7.8–10.44)
CHLORIDE SERPL-SCNC: 103 MMOL/L (ref 98–107)
CO2 SERPL-SCNC: 22 MMOL/L (ref 23–31)
CREAT CL PREDICTED SERPL C-G-VRATE: 26 ML/MIN (ref 70–130)
EOSINOPHIL # BLD AUTO: 0.1 THOU/UL (ref 0–0.7)
EOSINOPHIL NFR BLD AUTO: 0.8 % (ref 0–10)
GLUCOSE SERPL-MCNC: 192 MG/DL (ref 83–110)
HGB BLD-MCNC: 9.9 G/DL (ref 12–16)
LYMPHOCYTES # BLD: 1.6 THOU/UL (ref 1.2–3.4)
LYMPHOCYTES NFR BLD AUTO: 8.8 % (ref 21–51)
MCH RBC QN AUTO: 29.6 PG (ref 27–31)
MCV RBC AUTO: 88.1 FL (ref 78–98)
MONOCYTES # BLD AUTO: 1.3 THOU/UL (ref 0.11–0.59)
MONOCYTES NFR BLD AUTO: 7.4 % (ref 0–10)
NEUTROPHILS # BLD AUTO: 14.9 THOU/UL (ref 1.4–6.5)
NEUTROPHILS NFR BLD AUTO: 82.7 % (ref 42–75)
PLATELET # BLD AUTO: 369 THOU/UL (ref 130–400)
POTASSIUM SERPL-SCNC: 3.4 MMOL/L (ref 3.5–5.1)
RBC # BLD AUTO: 3.33 MILL/UL (ref 4.2–5.4)
SODIUM SERPL-SCNC: 134 MMOL/L (ref 136–145)
WBC # BLD AUTO: 18 THOU/UL (ref 4.8–10.8)

## 2020-02-14 RX ADMIN — INSULIN LISPRO PRN UNIT: 100 INJECTION, SOLUTION INTRAVENOUS; SUBCUTANEOUS at 17:13

## 2020-02-14 RX ADMIN — AZITHROMYCIN SCH MLS: 500 INJECTION, POWDER, LYOPHILIZED, FOR SOLUTION INTRAVENOUS at 20:50

## 2020-02-14 RX ADMIN — INSULIN LISPRO PRN UNIT: 100 INJECTION, SOLUTION INTRAVENOUS; SUBCUTANEOUS at 06:36

## 2020-02-14 RX ADMIN — INSULIN LISPRO PRN UNIT: 100 INJECTION, SOLUTION INTRAVENOUS; SUBCUTANEOUS at 12:22

## 2020-02-14 RX ADMIN — CEFTRIAXONE SCH MLS: 1 INJECTION, POWDER, FOR SOLUTION INTRAMUSCULAR; INTRAVENOUS at 19:55

## 2020-02-14 RX ADMIN — CYANOCOBALAMIN TAB 1000 MCG SCH MCG: 1000 TAB at 09:28

## 2020-02-15 RX ADMIN — AZITHROMYCIN SCH MLS: 500 INJECTION, POWDER, LYOPHILIZED, FOR SOLUTION INTRAVENOUS at 19:33

## 2020-02-15 RX ADMIN — INSULIN LISPRO PRN UNIT: 100 INJECTION, SOLUTION INTRAVENOUS; SUBCUTANEOUS at 12:42

## 2020-02-15 RX ADMIN — CYANOCOBALAMIN TAB 1000 MCG SCH MCG: 1000 TAB at 09:14

## 2020-02-15 RX ADMIN — CEFTRIAXONE SCH MLS: 1 INJECTION, POWDER, FOR SOLUTION INTRAMUSCULAR; INTRAVENOUS at 19:33

## 2020-02-15 NOTE — PDOC.HOSPP
- Subjective


Encounter Date: 02/15/20


Encounter Time: 10:00


Subjective: 





no overnight events. Per home aid at bedside, patient more alert than yesterday 

but still not at baseline. Patient responds to yes/no questions but is confused 

(baseline).  





- Objective


Vital Signs & Weight: 


 Vital Signs (12 hours)











  Temp Pulse Resp BP Pulse Ox


 


 02/15/20 08:00  98.2 F  86  14  143/81 H  93 L








 Weight











Admit Weight                   119 lb 14.4 oz


 


Weight                         119 lb 14.4 oz














I&O: 


 











 02/14/20 02/15/20 02/16/20





 06:59 06:59 06:59


 


Intake Total 1620 1050 


 


Balance 1620 1050 











Result Diagrams: 


 02/14/20 05:16





 02/14/20 05:16


Additional Labs: 


 Accuchecks











  02/15/20 02/15/20





  16:19 11:27


 


POC Glucose  191 H  241 H














Hospitalist ROS





- Review of Systems


ROS unobtainable: due to mental status (confused (baseline), unreliable)





- Medication


Medications: 


Active Medications











Generic Name Dose Route Start Last Admin





  Trade Name Freq  PRN Reason Stop Dose Admin


 


Acetaminophen  650 mg  02/11/20 20:33  02/13/20 21:15





  Tylenol  PO   650 mg





  Q4H PRN   Administration





  Headache/Fever/Mild Pain (1-3)   





     





     





     


 


Cyanocobalamin  1,000 mcg  02/13/20 09:00  02/15/20 09:14





  Vitamin B-12  PO   1,000 mcg





  DAILY RITA   Administration





     





     





     





     


 


Enoxaparin Sodium  30 mg  02/12/20 09:00  02/15/20 09:11





  Lovenox  SC   Not Given





  [facility]   





     





     





     





     


 


Guaifenesin  400 mg  02/11/20 20:46  02/14/20 09:28





  Organ-I Nr  PO   400 mg





  Q4H PRN   Administration





  Cough   





     





     





     


 


HCTZ/Losartan Potassium  1 tab  02/13/20 09:00  02/15/20 09:14





  Hyzaar 50/12.5  PO   1 tab





  DAILY RITA   Administration





     





     





     





     


 


Azithromycin 500 mg/ Sodium  250 mls @ 250 mls/hr  02/11/20 22:00  02/14/20 20:

50





  Chloride  IVPB   250 mls





  Q24HR RITA   Administration





     





     





     





     


 


Ceftriaxone Sodium 1 gm/  100 mls @ 200 mls/hr  02/11/20 21:00  02/14/20 19:55





  Sodium Chloride  IVPB   100 mls





  Q24HR RITA   Administration





     





     





     





     


 


Lactated Ringer's  1,000 mls @ 50 mls/hr  02/13/20 13:30  02/15/20 05:18





  Lactated Ringer's  IV   Not Given





  .Q20H RITA   





     





     





     





     


 


Insulin Human Lispro  0 units  02/11/20 23:53  02/15/20 12:42





  Humalog  SC   3 unit





  .MILD SLIDING SCALE PRN   Administration





  Mild Correctional Scale   





     





     





     


 


Insulin Human Lispro  0 units  02/11/20 23:53  02/12/20 21:12





  Humalog  SC   3 unit





  .BEDTIME SLIDING SC PRN   Administration





  Bedtime Correctional Scale   





     





     





     


 


Metformin HCl  500 mg  02/13/20 09:00  02/15/20 09:14





  Glucophage  PO   500 mg





  DAILY RITA   Administration





     





     





     





     


 


Rosuvastatin Calcium  10 mg  02/13/20 09:00  02/15/20 09:14





  Crestor  PO   10 mg





  DAILY RITA   Administration





     





     





     





     














- Exam


General Appearance: NAD, awake alert


Neck: no JVD


Heart: RRR, no murmur, no gallops, no rubs, normal peripheral pulses


Respiratory: CTAB, no wheezes, no rales, no ronchi, normal chest expansion, no 

tachypnea, normal percussion


Gastrointestinal: soft, non-tender, non-distended, normal bowel sounds, no 

palpable masses, no hepatomegaly, no splenomegaly, no bruit


Extremities: no edema


Psychiatric: normal affect, normal behavior, not oriented





Hosp A/P





- Plan





#community acquired pneumonia


-clinically improving; afebrile since admission


-currently rocephin/azithromycin (d4)


-no clinical instability factors


-infectious workup negative





Plan:


will continue for one more day (complete 5 days of treatment)


-pending placement back at [place]





dispo/code status:


full code


GI PPX: no indication


DVT PPX: lovenox subq

## 2020-02-16 LAB
ANION GAP SERPL CALC-SCNC: 10 MMOL/L (ref 10–20)
BUN SERPL-MCNC: 12 MG/DL (ref 9.8–20.1)
CALCIUM SERPL-MCNC: 7.8 MG/DL (ref 7.8–10.44)
CHLORIDE SERPL-SCNC: 101 MMOL/L (ref 98–107)
CO2 SERPL-SCNC: 27 MMOL/L (ref 23–31)
CREAT CL PREDICTED SERPL C-G-VRATE: 30 ML/MIN (ref 70–130)
GLUCOSE SERPL-MCNC: 158 MG/DL (ref 83–110)
MAGNESIUM SERPL-MCNC: 1.5 MG/DL (ref 1.6–2.6)
POTASSIUM SERPL-SCNC: 3.3 MMOL/L (ref 3.5–5.1)
SODIUM SERPL-SCNC: 135 MMOL/L (ref 136–145)

## 2020-02-16 RX ADMIN — INSULIN LISPRO PRN UNIT: 100 INJECTION, SOLUTION INTRAVENOUS; SUBCUTANEOUS at 12:29

## 2020-02-16 RX ADMIN — CYANOCOBALAMIN TAB 1000 MCG SCH MCG: 1000 TAB at 08:46

## 2020-02-16 RX ADMIN — INSULIN LISPRO PRN UNIT: 100 INJECTION, SOLUTION INTRAVENOUS; SUBCUTANEOUS at 17:06

## 2020-02-16 NOTE — PDOC.HOSPP
- Subjective


Encounter Date: 02/16/20


Encounter Time: 08:00


Subjective: 





no overnight evnets. This morning, mental state at baseline though per family 

members at bedside appears weaker. endorses feeling well and has no complaints.





- Objective


Vital Signs & Weight: 


 Vital Signs (12 hours)











  Temp Pulse Resp BP Pulse Ox


 


 02/16/20 16:56  98.6 F  72  18  151/61 H  95


 


 02/16/20 11:25  99.4 F  81  18  153/82 H  93 L


 


 02/16/20 08:13  99.2 F  83  18  151/70 H  91 L


 


 02/16/20 08:00      91 L








 Weight











Admit Weight                   119 lb 14.4 oz


 


Weight                         119 lb 14.4 oz














I&O: 


 











 02/15/20 02/16/20 02/17/20





 06:59 06:59 06:59


 


Intake Total 1050  1300


 


Balance 1050  1300











Result Diagrams: 


 02/14/20 05:16





 02/16/20 04:38


Additional Labs: 


 Accuchecks











  02/16/20 02/16/20 02/16/20





  17:00 11:25 04:17


 


POC Glucose  235 H  240 H  168 H














  02/15/20





  20:51


 


POC Glucose  251 H














Hospitalist ROS





- Review of Systems


Constitutional: denies: fever, chills, sweats, weakness, malaise, other


Respiratory: denies: cough, dry, shortness of breath, hemoptysis, SOB with 

excertion, pleuritic pain, sputum, wheezing, other


Cardiovascular: denies: chest pain, palpitations, orthopnea, paroxysmal noc. 

dyspnea, edema, light headedness, other


Gastrointestinal: denies: nausea, vomiting, abdominal pain, diarrhea, 

constipation, melena, hematochezia, other


Genitourinary: denies: dysuria, frequency, incontinence, hematuria, retention, 

other


Neurological: reports: weakness, confusion (baseline  due to dementia).  denies

: numbness, incoordination, change in speech





- Medication


Medications: 


Active Medications











Generic Name Dose Route Start Last Admin





  Trade Name Freq  PRN Reason Stop Dose Admin


 


Cyanocobalamin  1,000 mcg  02/13/20 09:00  02/16/20 08:46





  Vitamin B-12  PO   1,000 mcg





  DAILY RITA   Administration





     





     





     





     


 


Enoxaparin Sodium  30 mg  02/12/20 09:00  02/16/20 08:46





  Lovenox  SC   Not Given





  0900 RITA   





     





     





     





     


 


HCTZ/Losartan Potassium  1 tab  02/13/20 09:00  02/16/20 08:47





  Hyzaar 50/12.5  PO   1 tab





  DAILY RITA   Administration





     





     





     





     


 


Insulin Human Lispro  0 units  02/11/20 23:53  02/16/20 17:06





  Humalog  SC   3 unit





  .MILD SLIDING SCALE PRN   Administration





  Mild Correctional Scale   





     





     





     


 


Insulin Human Lispro  0 units  02/11/20 23:53  02/12/20 21:12





  Humalog  SC   3 unit





  .BEDTIME SLIDING SC PRN   Administration





  Bedtime Correctional Scale   





     





     





     


 


Metformin HCl  500 mg  02/13/20 09:00  02/16/20 08:47





  Glucophage  PO   500 mg





  DAILY RITA   Administration





     





     





     





     


 


Rosuvastatin Calcium  10 mg  02/13/20 09:00  02/16/20 08:47





  Crestor  PO   10 mg





  DAILY RITA   Administration





     





     





     





     














- Exam


General Appearance: NAD, awake alert


Eye: anicteric sclera


ENT: normocephalic atraumatic


Neck: no JVD


Heart: RRR, no murmur, no gallops, no rubs


Respiratory: CTAB, no wheezes, no rales, no ronchi, normal chest expansion, no 

tachypnea


Gastrointestinal: soft, non-tender, non-distended, normal bowel sounds


Extremities: no edema


Neurological: cranial nerve grossly intact


Musculoskeletal: normal tone, normal strength


Psychiatric: normal affect, normal behavior, oriented to person (at basline), 

oriented to place





Hosp A/P





- Plan





#community acquired pneumonia


-clinically improving; afebrile since admission


-currently rocephin/azithromycin (d5); completed treatment


-no clinical instability factors


-infectious workup negative





Plan:


-stop abx


-pending placement back at [place] 2/17 AM





dispo/code status:


full code


GI PPX: no indication


DVT PPX: lovenox subq

## 2020-02-17 VITALS — TEMPERATURE: 98.6 F | SYSTOLIC BLOOD PRESSURE: 155 MMHG | DIASTOLIC BLOOD PRESSURE: 66 MMHG

## 2020-02-17 LAB
ANION GAP SERPL CALC-SCNC: 12 MMOL/L (ref 10–20)
BUN SERPL-MCNC: 10 MG/DL (ref 9.8–20.1)
CALCIUM SERPL-MCNC: 7.9 MG/DL (ref 7.8–10.44)
CHLORIDE SERPL-SCNC: 103 MMOL/L (ref 98–107)
CO2 SERPL-SCNC: 25 MMOL/L (ref 23–31)
CREAT CL PREDICTED SERPL C-G-VRATE: 33 ML/MIN (ref 70–130)
GLUCOSE SERPL-MCNC: 144 MG/DL (ref 83–110)
MAGNESIUM SERPL-MCNC: 1.6 MG/DL (ref 1.6–2.6)
POTASSIUM SERPL-SCNC: 3.5 MMOL/L (ref 3.5–5.1)
SODIUM SERPL-SCNC: 136 MMOL/L (ref 136–145)

## 2020-02-17 RX ADMIN — CYANOCOBALAMIN TAB 1000 MCG SCH MCG: 1000 TAB at 09:20

## 2020-02-18 NOTE — DIS
DATE OF ADMISSION:  02/11/2020



DATE OF DISCHARGE:  02/17/2020



HOSPITAL COURSE:  Ms. Walls is an 89-year-old lady with a medical history of

dementia, type 2 diabetes, hypertension, who presents from nursing home for

decreased oral intake.  She was diagnosed with community-acquired pneumonia and 
was

treated with antibiotics for 5 days.  She progressively improved and her family

members at bedside was nearly at baseline on the day of discharge.  On the day 
of

discharge, she was hemodynamically stable.  Vitals were unremarkable. 



PHYSICAL EXAMINATION:

GENERAL:  She is in no apparent distress.  Awake and alert, oriented to person 
only,

and that is her baseline. 

HEENT:  Eye exam, anicteric sclerae.  ENT; normocephalic, atraumatic. 

NECK:  No JVD. 

HEART:  Regular rate and rhythm.  No murmur.  No gallops.  No rubs. 

RESPIRATORY:  Clear to auscultation bilaterally.  No wheezing, rales, or 
rhonchi.

Normal chest expansion.  No tachypnea. 

GI:  Soft, nontender, and nondistended.  Normal bowel sounds. 

EXTREMITIES:  No edema. 

NEUROLOGICAL:  Cranial nerves grossly intact. 

MUSCULOSKELETAL:  Normal tone and normal strength. 

PSYCHIATRIC:  Flat affect, non agitated, oriented to person only, which is her

baseline, alternately oriented to place as well. 



ASSESSMENT AND PLAN:  Ms. Walls is an 89-year-old female with a medical 
history of

dementia, who presented with community-acquired pneumonia.  She was treated 
with IV

antibiotics throughout her hospital stay for 5 days with Rocephin and 
azithromycin.

She completed the treatment and progressively improved.  She was discharged to a

skilled nursing facility at [place], per her family, which was at bedside.  
She

was at baseline at the time of discharge. 







Job ID:  [id number]



[facility]